# Patient Record
Sex: MALE | Race: WHITE | URBAN - METROPOLITAN AREA
[De-identification: names, ages, dates, MRNs, and addresses within clinical notes are randomized per-mention and may not be internally consistent; named-entity substitution may affect disease eponyms.]

---

## 2022-03-09 ENCOUNTER — OFFICE VISIT (OUTPATIENT)
Dept: URGENT CARE | Facility: URGENT CARE | Age: 22
End: 2022-03-09
Payer: COMMERCIAL

## 2022-03-09 VITALS
WEIGHT: 211 LBS | TEMPERATURE: 97.5 F | OXYGEN SATURATION: 96 % | SYSTOLIC BLOOD PRESSURE: 145 MMHG | DIASTOLIC BLOOD PRESSURE: 73 MMHG | RESPIRATION RATE: 16 BRPM | HEART RATE: 95 BPM

## 2022-03-09 DIAGNOSIS — L05.01 PILONIDAL CYST WITH ABSCESS: Primary | ICD-10-CM

## 2022-03-09 PROCEDURE — 99203 OFFICE O/P NEW LOW 30 MIN: CPT | Performed by: PHYSICIAN ASSISTANT

## 2022-03-09 RX ORDER — CEPHALEXIN 500 MG/1
500 CAPSULE ORAL 4 TIMES DAILY
Qty: 40 CAPSULE | Refills: 0 | Status: SHIPPED | OUTPATIENT
Start: 2022-03-09 | End: 2022-03-19

## 2022-03-09 RX ORDER — IBUPROFEN 600 MG/1
600 TABLET, FILM COATED ORAL EVERY 6 HOURS PRN
Qty: 30 TABLET | Refills: 0 | Status: SHIPPED | OUTPATIENT
Start: 2022-03-09

## 2022-03-09 ASSESSMENT — ENCOUNTER SYMPTOMS
RHINORRHEA: 0
WHEEZING: 0
PALPITATIONS: 0
CARDIOVASCULAR NEGATIVE: 1
FATIGUE: 0
CHILLS: 0
RESPIRATORY NEGATIVE: 1
FEVER: 0
SORE THROAT: 0
CHEST TIGHTNESS: 0
WOUND: 0
SHORTNESS OF BREATH: 0
COLOR CHANGE: 1
COUGH: 0

## 2022-03-09 NOTE — PROGRESS NOTES
Fletcher Kenny is a 22 year old who presents for the following health issues   HPI   Concern - bump on his tailbone  Onset: 2days  Description:  Noticed tender bump on his tailbone 2days ago after driving home from school.  Some drainage but no fevers.  Intensity: moderate  Progression of Symptoms:  same  Accompanying Signs & Symptoms:  No radicular pain, numbness, tingling or weakness.  No trauma or injuries.  Previous history of similar problem: no  Precipitating factors:        Worsened by: none  Alleviating factors:        Improved by: none  Therapies tried and outcome: warm compresses, ibuprofen with minimal relief    There is no problem list on file for this patient.    Current Outpatient Medications   Medication     sertraline (ZOLOFT) 50 MG tablet     No current facility-administered medications for this visit.      No Known Allergies    Review of Systems   Constitutional: Negative for chills, fatigue and fever.   HENT: Negative.  Negative for congestion, ear pain, rhinorrhea and sore throat.    Respiratory: Negative.  Negative for cough, chest tightness, shortness of breath and wheezing.    Cardiovascular: Negative.  Negative for chest pain, palpitations and peripheral edema.   Skin: Positive for color change and rash. Negative for pallor and wound.   All other systems reviewed and are negative.           Objective    BP (!) 145/73 (BP Location: Left arm, Patient Position: Sitting, Cuff Size: Adult Regular)   Pulse 95   Temp 97.5  F (36.4  C) (Tympanic)   Resp 16   Wt 95.7 kg (211 lb)   SpO2 96%   There is no height or weight on file to calculate BMI.  Physical Exam  Vitals and nursing note reviewed.   Constitutional:       General: He is not in acute distress.     Appearance: Normal appearance. He is well-developed and normal weight. He is not ill-appearing.   Skin:     General: Skin is warm and dry.      Findings: Abscess (pilonidal region.  moderate tenderness but no discharge), erythema and  rash present. No abrasion, bruising, ecchymosis or wound. Rash is not nodular, purpuric, pustular, urticarial or vesicular.   Neurological:      Mental Status: He is alert and oriented to person, place, and time.   Psychiatric:         Mood and Affect: Mood normal.         Behavior: Behavior normal.         Thought Content: Thought content normal.         Judgment: Judgment normal.          Assessment/Plan:  Pilonidal cyst with abscess:  This does not appear ready for I&D.  Will treat with [id number].  Recommend tylenol/ibuprofen prn pain/fever, warm sitz baths, warm compresses.   Rest, fluids, chicken soup.  Recheck in clinic if symptoms worsen or if symptoms do not improve.  Will send to colorectal if no improvement.  -     cephALEXin (KEFLEX) 500 MG capsule; Take 1 capsule (500 mg) by mouth 4 times daily for 10 days  -     ibuprofen (ADVIL/MOTRIN) 600 MG tablet; Take 1 tablet (600 mg) by mouth every 6 hours as needed for moderate pain  -     Colorectal Surgery Referral        Kalee Vallecillo PA-C